# Patient Record
Sex: FEMALE | URBAN - METROPOLITAN AREA
[De-identification: names, ages, dates, MRNs, and addresses within clinical notes are randomized per-mention and may not be internally consistent; named-entity substitution may affect disease eponyms.]

---

## 2017-09-12 ENCOUNTER — PRE VISIT (OUTPATIENT)
Dept: NEUROLOGY | Facility: CLINIC | Age: 22
End: 2017-09-12

## 2017-09-12 NOTE — TELEPHONE ENCOUNTER
1.  Date/reason for appt:9/16/17, Dizziness, headaches, & confusion     2.  Referring provider: unknown     3.  Call to patient (Yes / No - short description): No, requesting records    4.  Previous care at / records requested from:   [facility]- faxed cover sheet.